# Patient Record
Sex: FEMALE | Race: WHITE | NOT HISPANIC OR LATINO | URBAN - METROPOLITAN AREA
[De-identification: names, ages, dates, MRNs, and addresses within clinical notes are randomized per-mention and may not be internally consistent; named-entity substitution may affect disease eponyms.]

---

## 2019-12-25 ENCOUNTER — EMERGENCY (EMERGENCY)
Facility: HOSPITAL | Age: 34
LOS: 0 days | Discharge: HOME | End: 2019-12-25
Attending: EMERGENCY MEDICINE | Admitting: EMERGENCY MEDICINE
Payer: COMMERCIAL

## 2019-12-25 VITALS
SYSTOLIC BLOOD PRESSURE: 133 MMHG | OXYGEN SATURATION: 98 % | RESPIRATION RATE: 19 BRPM | HEART RATE: 94 BPM | TEMPERATURE: 97 F | WEIGHT: 141.98 LBS | DIASTOLIC BLOOD PRESSURE: 88 MMHG
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
## 2019-12-25 DIAGNOSIS — Z3A.20 20 WEEKS GESTATION OF PREGNANCY: ICD-10-CM

## 2019-12-25 DIAGNOSIS — Z79.899 OTHER LONG TERM (CURRENT) DRUG THERAPY: ICD-10-CM

## 2019-12-25 DIAGNOSIS — M54.9 DORSALGIA, UNSPECIFIED: ICD-10-CM
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
## 2019-12-25 DIAGNOSIS — O9A.212 INJURY, POISONING AND CERTAIN OTHER CONSEQUENCES OF EXTERNAL CAUSES COMPLICATING PREGNANCY, SECOND TRIMESTER: ICD-10-CM
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
## 2019-12-25 DIAGNOSIS — S30.0XXA CONTUSION OF LOWER BACK AND PELVIS, INITIAL ENCOUNTER: ICD-10-CM

## 2019-12-25 PROCEDURE — 99284 EMERGENCY DEPT VISIT MOD MDM: CPT

## 2019-12-25 NOTE — ED PROVIDER NOTE - PATIENT PORTAL LINK FT
You can access the FollowMyHealth Patient Portal offered by [facility] by registering at the following website: [email or web address]. By joining [facility]’s FollowMyHealth portal, you will also be able to view your health information using other applications (apps) compatible with our system.

## 2019-12-25 NOTE — ED ADULT TRIAGE NOTE - CHIEF COMPLAINT QUOTE
pt fell about 8 stairs today. Pt has injury to lower back. Pt is 4 months pregnant. Pt states she did not hit her stomach

## 2019-12-25 NOTE — ED PROVIDER NOTE - ATTENDING CONTRIBUTION TO CARE
35yo F, , approx 20 weeks pregnant, presents after mechanical fall. Pt was going down steps when she missed a step, fell back onto her buttocks then slid down the stairs on her back approx 8-9 steps. No head trauma, no LOC, not on AC, remembers all events. Ambulatory after fall. C/o mild tailbone pain. Denies abdominal pain/cramping, pelvic pain, vaginal bleeding or discharge, nausea, vomiting. No chest pain, SOB, lightheadedness. Denies fever, headache, lightheadedness, CP, SOB, cough, palpitations, nausea, vomiting, diarrhea, abd pain, dysuria, hematuria, leg swelling. 33yo F, , approx 20 weeks pregnant, presents after mechanical fall. Pt was going down steps when she missed a step, fell back onto her buttocks then slid down the stairs on her back approx 8-9 steps. No head trauma, no LOC, not on AC, remembers all events. Ambulatory after fall. C/o mild tailbone pain. Denies abdominal pain/cramping, pelvic pain, vaginal bleeding or discharge, nausea, vomiting. No chest pain, SOB, lightheadedness. Denies neck pain, extremity pain.     Vital signs reviewed  GENERAL: Patient nontoxic appearing, NAD  HEAD: NCAT, no signs of basilar skull fx  EYES: Anicteric  ENT: MMM  NECK: Supple, non tender, no midline TTP.   RESPIRATORY: Normal respiratory effort. CTA B/L. No wheezing, rales, rhonchi  CARDIOVASCULAR: Regular rate and rhythm  ABDOMEN: Soft. Nondistended. Nontender. No guarding or rebound.   MUSCULOSKELETAL/EXTREMITIES: Brisk cap refill. Equal radial pulses. FROM extremities. No midline back TTP.   SKIN:  Warm and dry  NEURO: AAOx3. GCS 15. Speech clear and coherent. Answering questions appropriately. Face symmetric, no facial droop. Strength 5/5 x4. Ambulating normally, no gait abnormality. No gross FND.

## 2019-12-25 NOTE — ED PROVIDER NOTE - OBJECTIVE STATEMENT
Patient is a 33 yo F, , 4 months pregnant p/w fall. Patient slid down 8-9 steps, no head injury, no LOC. Fall occurred approx 1.5 hours ago. Endorses mild right lower back aching; no problems ambulating. No abdominal pain, no cramps, no pelvic pain, no vaginal bleeding or discharge. No vomiting. No chest pain, no SOB, no extremity pain.

## 2019-12-25 NOTE — ED PROVIDER NOTE - NSFOLLOWUPINSTRUCTIONS_ED_ALL_ED_FT
Contusion    A contusion is a deep bruise. Contusions are the result of a blunt injury to tissues and muscle fibers under the skin. The skin overlying the contusion may turn blue, purple, or yellow. Symptoms also include pain and swelling in the injured area.    SEEK IMMEDIATE MEDICAL CARE IF YOU HAVE ANY OF THE FOLLOWING SYMPTOMS: severe pain, numbness, tingling, pain, weakness, or skin color/temperature change in any part of your body distal to the injury.      PLEASE FOLLOW UP WITH YOUR OB in 24-48 hours.     Preventing Injuries During Pregnancy  Trauma is the most common cause of injury and death in pregnant women. This can also result in serious harm to the baby or even death.  How can injuries affect my pregnancy?  Your baby is protected in the womb (uterus) by a sac filled with fluid (amniotic sac). Your baby can be harmed if there is a direct blow to your abdomen and pelvis. Trauma may be caused by:  Falls. These are more common in the second and third trimester of pregnancy.Automobile accidents.Domestic violence or assault.Severe burns, such as from fire or electricity.These injuries can result in:  Tearing of your uterus.The placenta pulling away from the wall of the uterus (placental abruption).The amniotic sac breaking open (rupture of membranes).Blockage or decrease in the blood supply to your baby.Going into labor earlier than expected.Severe injuries to other parts of your body, such as your brain, spine, heart, lungs, or other organs.Minor falls and low-impact automobile accidents do not usually harm your baby, even if they cause a little harm to you.  What can I do to lower my risk?  Safety     Remove slippery rugs and loose objects on the floor. They increase your risk of tripping or slipping.Wear comfortable shoes that have a good  on the sole. Do not wear high-heeled shoes.Always wear your seat belt properly when riding in a car. Use both the lap and shoulder belt, with the lap belt below your abdomen. Always practice safe driving. Do not ride on a motorcycle while pregnant.Activity     Avoid walking on wet or slippery floors.Do not participate in rough and violent activities or sports.Avoid high-risk situations and activities such as:  Lifting heavy pots of boiling or hot liquids.Fixing electrical problems.Being near fires or starting fires.General instructions     Take over-the-counter and prescription medicines only as told by your health care provider.Know your blood type and the father's blood type in case you develop vaginal bleeding or experience an injury for which a blood transfusion is needed.Spousal abuse can be a serious cause of trauma during pregnancy. If you are a victim of domestic violence or assault:  Call your local emergency services (911 in the [place]Contact the National Domestic Violence Hotline for help and support.When should I seek immediate medical care?  Get help right away if:  You fall on your abdomen or experience any serious blow to your abdomen.You develop stiffness in your neck or pain after a fall or from other trauma.You develop a headache or vision problems after a fall or from other trauma.You do not feel the baby moving after a fall or trauma, or you feel that the baby is not moving as much as before the fall or trauma.You have been the victim of domestic violence or any other kind of physical attack.You have been in a car accident.You develop vaginal bleeding.You have fluid leaking from the vagina.You develop uterine contractions. Symptoms include pelvic cramping, pain, or serious low back pain.You become weak, faint, or have uncontrolled vomiting after trauma.You have a serious burn. This includes burns to the face, neck, hands, or genitals, or burns greater than the size of your palm anywhere else.Summary  Trauma is the most common cause of injury and death in pregnant women and can also lead to injury or death of the baby.Falls, automobile accidents, domestic violence or assault, and severe burns can injure you or your baby. Make sure to get medical help right away if you experience any of these during your pregnancy.Take steps to prevent slips or falls in your home, such as avoiding slippery floors and removing loose rugs.Always wear your seat belt properly when riding in a car. Practice safe driving.This information is not intended to replace advice given to you by your health care provider. Make sure you discuss any questions you have with your health care provider.

## 2019-12-25 NOTE — ED PROVIDER NOTE - CLINICAL SUMMARY MEDICAL DECISION MAKING FREE TEXT BOX
35yo F presents after fall, landed on buttocks, slid down steps. No direct abdominal trauma, no head trauma. POCUS showing , +movement. Pt with stable vitals, well appearing. Pt has close f/u with Obgyn. Return precautions given.

## 2019-12-25 NOTE — ED PROVIDER NOTE - NS ED ROS FT
Constitutional: No altered mental status.  Eyes: No visual changes.  ENT: No hearing loss.  Neck: No neck pain or stiffness.  Cardiovascular: No chest pain, palpitations.  Pulmonary: No SOB. No hemoptysis.  Abdominal: No abdominal pain, nausea, vomiting.   : No hematuria. +pregnant.   Neuro: No headache, syncope, dizziness.  MS: +mild back ache.   Psych: No suicidal ideations.

## 2025-03-15 ENCOUNTER — EMERGENCY (EMERGENCY)
Facility: HOSPITAL | Age: 40
LOS: 0 days | Discharge: HOME | End: 2025-03-15
Attending: EMERGENCY MEDICINE
Payer: COMMERCIAL

## 2025-03-15 VITALS
RESPIRATION RATE: 17 BRPM | OXYGEN SATURATION: 97 % | TEMPERATURE: 97 F | HEART RATE: 92 BPM | SYSTOLIC BLOOD PRESSURE: 160 MMHG | WEIGHT: 158.07 LBS | DIASTOLIC BLOOD PRESSURE: 104 MMHG

## 2025-03-15 VITALS
SYSTOLIC BLOOD PRESSURE: 152 MMHG | RESPIRATION RATE: 18 BRPM | HEART RATE: 78 BPM | OXYGEN SATURATION: 99 % | DIASTOLIC BLOOD PRESSURE: 82 MMHG

## 2025-03-15 DIAGNOSIS — R07.89 OTHER CHEST PAIN: ICD-10-CM

## 2025-03-15 LAB
ALBUMIN SERPL ELPH-MCNC: 4.6 G/DL — SIGNIFICANT CHANGE UP (ref 3.5–5.2)
ALP SERPL-CCNC: 52 U/L — SIGNIFICANT CHANGE UP (ref 30–115)
ALT FLD-CCNC: 15 U/L — SIGNIFICANT CHANGE UP (ref 0–41)
ANION GAP SERPL CALC-SCNC: 10 MMOL/L — SIGNIFICANT CHANGE UP (ref 7–14)
AST SERPL-CCNC: 18 U/L — SIGNIFICANT CHANGE UP (ref 0–41)
BASOPHILS # BLD AUTO: 0.06 K/UL — SIGNIFICANT CHANGE UP (ref 0–0.2)
BASOPHILS NFR BLD AUTO: 0.6 % — SIGNIFICANT CHANGE UP (ref 0–1)
BILIRUB SERPL-MCNC: 0.3 MG/DL — SIGNIFICANT CHANGE UP (ref 0.2–1.2)
BUN SERPL-MCNC: 11 MG/DL — SIGNIFICANT CHANGE UP (ref 10–20)
CALCIUM SERPL-MCNC: 9 MG/DL — SIGNIFICANT CHANGE UP (ref 8.4–10.5)
CHLORIDE SERPL-SCNC: 103 MMOL/L — SIGNIFICANT CHANGE UP (ref 98–110)
CO2 SERPL-SCNC: 25 MMOL/L — SIGNIFICANT CHANGE UP (ref 17–32)
CREAT SERPL-MCNC: 0.8 MG/DL — SIGNIFICANT CHANGE UP (ref 0.7–1.5)
D DIMER BLD IA.RAPID-MCNC: <150 NG/ML DDU — SIGNIFICANT CHANGE UP
EGFR: 97 ML/MIN/1.73M2 — SIGNIFICANT CHANGE UP
EGFR: 97 ML/MIN/1.73M2 — SIGNIFICANT CHANGE UP
EOSINOPHIL # BLD AUTO: 0.08 K/UL — SIGNIFICANT CHANGE UP (ref 0–0.7)
EOSINOPHIL NFR BLD AUTO: 0.8 % — SIGNIFICANT CHANGE UP (ref 0–8)
GLUCOSE SERPL-MCNC: 110 MG/DL — HIGH (ref 70–99)
HCG SERPL QL: NEGATIVE — SIGNIFICANT CHANGE UP
HCT VFR BLD CALC: 40.9 % — SIGNIFICANT CHANGE UP (ref 37–47)
HGB BLD-MCNC: 14.3 G/DL — SIGNIFICANT CHANGE UP (ref 12–16)
IMM GRANULOCYTES NFR BLD AUTO: 0.3 % — SIGNIFICANT CHANGE UP (ref 0.1–0.3)
LYMPHOCYTES # BLD AUTO: 1.11 K/UL — LOW (ref 1.2–3.4)
LYMPHOCYTES # BLD AUTO: 11.7 % — LOW (ref 20.5–51.1)
MCHC RBC-ENTMCNC: 31.2 PG — HIGH (ref 27–31)
MCHC RBC-ENTMCNC: 35 G/DL — SIGNIFICANT CHANGE UP (ref 32–37)
MCV RBC AUTO: 89.1 FL — SIGNIFICANT CHANGE UP (ref 81–99)
MONOCYTES # BLD AUTO: 0.49 K/UL — SIGNIFICANT CHANGE UP (ref 0.1–0.6)
MONOCYTES NFR BLD AUTO: 5.2 % — SIGNIFICANT CHANGE UP (ref 1.7–9.3)
NEUTROPHILS # BLD AUTO: 7.73 K/UL — HIGH (ref 1.4–6.5)
NEUTROPHILS NFR BLD AUTO: 81.4 % — HIGH (ref 42.2–75.2)
NRBC BLD AUTO-RTO: 0 /100 WBCS — SIGNIFICANT CHANGE UP (ref 0–0)
PLATELET # BLD AUTO: 245 K/UL — SIGNIFICANT CHANGE UP (ref 130–400)
PMV BLD: 9.6 FL — SIGNIFICANT CHANGE UP (ref 7.4–10.4)
POTASSIUM SERPL-MCNC: 4.1 MMOL/L — SIGNIFICANT CHANGE UP (ref 3.5–5)
POTASSIUM SERPL-SCNC: 4.1 MMOL/L — SIGNIFICANT CHANGE UP (ref 3.5–5)
PROT SERPL-MCNC: 6.7 G/DL — SIGNIFICANT CHANGE UP (ref 6–8)
RBC # BLD: 4.59 M/UL — SIGNIFICANT CHANGE UP (ref 4.2–5.4)
RBC # FLD: 13 % — SIGNIFICANT CHANGE UP (ref 11.5–14.5)
SODIUM SERPL-SCNC: 138 MMOL/L — SIGNIFICANT CHANGE UP (ref 135–146)
TROPONIN T, HIGH SENSITIVITY RESULT: <6 NG/L — SIGNIFICANT CHANGE UP (ref 6–13)
WBC # BLD: 9.5 K/UL — SIGNIFICANT CHANGE UP (ref 4.8–10.8)
WBC # FLD AUTO: 9.5 K/UL — SIGNIFICANT CHANGE UP (ref 4.8–10.8)

## 2025-03-15 PROCEDURE — 85025 COMPLETE CBC W/AUTO DIFF WBC: CPT

## 2025-03-15 PROCEDURE — 99285 EMERGENCY DEPT VISIT HI MDM: CPT

## 2025-03-15 PROCEDURE — 84703 CHORIONIC GONADOTROPIN ASSAY: CPT

## 2025-03-15 PROCEDURE — 99285 EMERGENCY DEPT VISIT HI MDM: CPT | Mod: 25

## 2025-03-15 PROCEDURE — 71045 X-RAY EXAM CHEST 1 VIEW: CPT | Mod: 26

## 2025-03-15 PROCEDURE — 93005 ELECTROCARDIOGRAM TRACING: CPT

## 2025-03-15 PROCEDURE — 85379 FIBRIN DEGRADATION QUANT: CPT

## 2025-03-15 PROCEDURE — 36415 COLL VENOUS BLD VENIPUNCTURE: CPT

## 2025-03-15 PROCEDURE — 93010 ELECTROCARDIOGRAM REPORT: CPT

## 2025-03-15 PROCEDURE — 80053 COMPREHEN METABOLIC PANEL: CPT

## 2025-03-15 PROCEDURE — 71045 X-RAY EXAM CHEST 1 VIEW: CPT

## 2025-03-15 PROCEDURE — 96374 THER/PROPH/DIAG INJ IV PUSH: CPT

## 2025-03-15 PROCEDURE — 84484 ASSAY OF TROPONIN QUANT: CPT

## 2025-03-15 RX ADMIN — Medication 20 MILLIGRAM(S): at 06:56
